# Patient Record
(demographics unavailable — no encounter records)

---

## 2024-10-08 NOTE — PROCEDURE
[Cerumen Impaction] : Cerumen Impaction [de-identified] : Indication for procedure:Unable to examine laryngeal structures with mirror exam.  Difficulty w persistent throat discomfort and excessive throat clearing The patient has gerd difficulty speaking in supine position  Scope # 86 Topical anesthesia with viscous xylocaine 2% is applied to the anterior nares. A flexible fiberoptic laryngoscope is than introduced through the nares. The nasopharynx is clear without mass or inflammation. The posterior pharyngeal wall is unremarkable. The tongue base and vallecula are unremarkable.  The hypopharynx is unremarkable and unobstructed. The supraglottic larynx is within normal limits. Both vocal cords are fully mobile with no nodule, polyp or other lesion present. There is no edema or erythema overlying the arytenoid cartilages or the inter-arytenoid space. The subglottic space is clear. The voice has a normal quality. [FreeTextEntry6] : Indication: ear plugging and discomfort Large amount cerumen cleared left and right ear instrumentation with curettes, forceps and suction. Ear canals and tympanic membranes  unremarkable. narrow au eac

## 2024-10-08 NOTE — HISTORY OF PRESENT ILLNESS
[de-identified] : 59 year old female presents for vertigo Co everyday 16 days, room spinning w postural changes in AM, looking up and down, or laying down. Feels lightheaded and spinning Hx cerumen buildup, L ear plugging, diminished hearing, and constant tinnitus for past 3 days. Used OTC drops, may have pushed wax w Q-tip. Lasts 1 minute long. Called PCP, EKG and BP checked. Taking OTC motion sickness medication- no relief. Physical therapy referred. No audiology. No hx migraine, no focal weakness. hx gerd, co difficulty w speech when in supine position

## 2024-10-08 NOTE — ASSESSMENT
[FreeTextEntry1] : cerumen cleared au eac stenosis upper airway clear good vc mobility unclear source of speech difficulty gerd omeprazole prn audio and tymp wnl hx consistent w bppv some improvement past few days rec vest rehab

## 2024-10-08 NOTE — CONSULT LETTER
[Consult Letter:] : I had the pleasure of evaluating your patient, [unfilled]. [Please see my note below.] : Please see my note below. [Consult Closing:] : Thank you very much for allowing me to participate in the care of this patient.  If you have any questions, please do not hesitate to contact me. [Sincerely,] : Sincerely, [FreeTextEntry3] : Antelmo Pierre MD, FACS [FreeTextEntry1] : Dear Dr. CLEOPATRA HERNANDES,  Thank you for your kind referral. Please refer to my enclosed office notes for PIEDAD ROBLERO . If there are any questions free to contact me.

## 2024-10-08 NOTE — PHYSICAL EXAM
[Midline] : trachea located in midline position [Normal] : no rashes [de-identified] : madhuri [] : Romberg test is negative [de-identified] : gait steady, VOR neg, no nystag